# Patient Record
Sex: MALE | Race: AMERICAN INDIAN OR ALASKA NATIVE | NOT HISPANIC OR LATINO | URBAN - METROPOLITAN AREA
[De-identification: names, ages, dates, MRNs, and addresses within clinical notes are randomized per-mention and may not be internally consistent; named-entity substitution may affect disease eponyms.]

---

## 2018-02-23 ENCOUNTER — EMERGENCY (EMERGENCY)
Facility: HOSPITAL | Age: 33
LOS: 1 days | Discharge: HOME | End: 2018-02-23
Attending: EMERGENCY MEDICINE | Admitting: EMERGENCY MEDICINE
Payer: MEDICAID

## 2018-02-23 VITALS
HEART RATE: 88 BPM | TEMPERATURE: 99 F | SYSTOLIC BLOOD PRESSURE: 129 MMHG | HEIGHT: 66 IN | OXYGEN SATURATION: 99 % | DIASTOLIC BLOOD PRESSURE: 81 MMHG | RESPIRATION RATE: 20 BRPM

## 2018-02-23 DIAGNOSIS — Z98.890 OTHER SPECIFIED POSTPROCEDURAL STATES: Chronic | ICD-10-CM

## 2018-02-23 LAB
ALBUMIN SERPL ELPH-MCNC: 4.7 G/DL — SIGNIFICANT CHANGE UP (ref 3.3–5)
ALP SERPL-CCNC: 95 U/L — SIGNIFICANT CHANGE UP (ref 40–120)
ALT FLD-CCNC: 52 U/L RC — HIGH (ref 10–45)
ANION GAP SERPL CALC-SCNC: 14 MMOL/L — SIGNIFICANT CHANGE UP (ref 5–17)
AST SERPL-CCNC: 32 U/L — SIGNIFICANT CHANGE UP (ref 10–40)
BASOPHILS # BLD AUTO: 0 K/UL — SIGNIFICANT CHANGE UP (ref 0–0.2)
BASOPHILS NFR BLD AUTO: 0.4 % — SIGNIFICANT CHANGE UP (ref 0–2)
BILIRUB SERPL-MCNC: 0.4 MG/DL — SIGNIFICANT CHANGE UP (ref 0.2–1.2)
BUN SERPL-MCNC: 10 MG/DL — SIGNIFICANT CHANGE UP (ref 7–23)
CALCIUM SERPL-MCNC: 9.5 MG/DL — SIGNIFICANT CHANGE UP (ref 8.4–10.5)
CHLORIDE SERPL-SCNC: 100 MMOL/L — SIGNIFICANT CHANGE UP (ref 96–108)
CO2 SERPL-SCNC: 26 MMOL/L — SIGNIFICANT CHANGE UP (ref 22–31)
CREAT SERPL-MCNC: 0.92 MG/DL — SIGNIFICANT CHANGE UP (ref 0.5–1.3)
EOSINOPHIL # BLD AUTO: 0.2 K/UL — SIGNIFICANT CHANGE UP (ref 0–0.5)
EOSINOPHIL NFR BLD AUTO: 2.8 % — SIGNIFICANT CHANGE UP (ref 0–6)
GLUCOSE SERPL-MCNC: 103 MG/DL — HIGH (ref 70–99)
HCT VFR BLD CALC: 47.1 % — SIGNIFICANT CHANGE UP (ref 39–50)
HGB BLD-MCNC: 15.9 G/DL — SIGNIFICANT CHANGE UP (ref 13–17)
LYMPHOCYTES # BLD AUTO: 2.6 K/UL — SIGNIFICANT CHANGE UP (ref 1–3.3)
LYMPHOCYTES # BLD AUTO: 36.4 % — SIGNIFICANT CHANGE UP (ref 13–44)
MCHC RBC-ENTMCNC: 28.3 PG — SIGNIFICANT CHANGE UP (ref 27–34)
MCHC RBC-ENTMCNC: 33.8 GM/DL — SIGNIFICANT CHANGE UP (ref 32–36)
MCV RBC AUTO: 83.7 FL — SIGNIFICANT CHANGE UP (ref 80–100)
MONOCYTES # BLD AUTO: 0.6 K/UL — SIGNIFICANT CHANGE UP (ref 0–0.9)
MONOCYTES NFR BLD AUTO: 8 % — SIGNIFICANT CHANGE UP (ref 2–14)
NEUTROPHILS # BLD AUTO: 3.8 K/UL — SIGNIFICANT CHANGE UP (ref 1.8–7.4)
NEUTROPHILS NFR BLD AUTO: 52.4 % — SIGNIFICANT CHANGE UP (ref 43–77)
PLATELET # BLD AUTO: 173 K/UL — SIGNIFICANT CHANGE UP (ref 150–400)
POTASSIUM SERPL-MCNC: 3.9 MMOL/L — SIGNIFICANT CHANGE UP (ref 3.5–5.3)
POTASSIUM SERPL-SCNC: 3.9 MMOL/L — SIGNIFICANT CHANGE UP (ref 3.5–5.3)
PROT SERPL-MCNC: 8.4 G/DL — HIGH (ref 6–8.3)
RBC # BLD: 5.63 M/UL — SIGNIFICANT CHANGE UP (ref 4.2–5.8)
RBC # FLD: 11.6 % — SIGNIFICANT CHANGE UP (ref 10.3–14.5)
SODIUM SERPL-SCNC: 140 MMOL/L — SIGNIFICANT CHANGE UP (ref 135–145)
WBC # BLD: 7.3 K/UL — SIGNIFICANT CHANGE UP (ref 3.8–10.5)
WBC # FLD AUTO: 7.3 K/UL — SIGNIFICANT CHANGE UP (ref 3.8–10.5)

## 2018-02-23 PROCEDURE — 99284 EMERGENCY DEPT VISIT MOD MDM: CPT

## 2018-02-23 PROCEDURE — 80053 COMPREHEN METABOLIC PANEL: CPT

## 2018-02-23 PROCEDURE — 85027 COMPLETE CBC AUTOMATED: CPT

## 2018-02-23 PROCEDURE — 99283 EMERGENCY DEPT VISIT LOW MDM: CPT

## 2018-02-23 PROCEDURE — 82550 ASSAY OF CK (CPK): CPT

## 2018-02-23 RX ORDER — ACETAMINOPHEN 500 MG
650 TABLET ORAL ONCE
Qty: 0 | Refills: 0 | Status: COMPLETED | OUTPATIENT
Start: 2018-02-23 | End: 2018-02-23

## 2018-02-23 RX ORDER — IBUPROFEN 200 MG
600 TABLET ORAL ONCE
Qty: 0 | Refills: 0 | Status: COMPLETED | OUTPATIENT
Start: 2018-02-23 | End: 2018-02-23

## 2018-02-23 RX ORDER — LIDOCAINE 4 G/100G
1 CREAM TOPICAL ONCE
Qty: 0 | Refills: 0 | Status: COMPLETED | OUTPATIENT
Start: 2018-02-23 | End: 2018-02-23

## 2018-02-23 RX ADMIN — Medication 650 MILLIGRAM(S): at 20:13

## 2018-02-23 RX ADMIN — LIDOCAINE 1 PATCH: 4 CREAM TOPICAL at 20:15

## 2018-02-23 RX ADMIN — Medication 600 MILLIGRAM(S): at 20:13

## 2018-02-23 NOTE — ED PROVIDER NOTE - OBJECTIVE STATEMENT
[place]  ID [id number] and [id number]    33 yo M c PMH of HLD p/w a 3 year hx of atraumatic R neck and R shoulder pain. Pt reports he has had intermittent non-radiating R neck and shoulder pain occurring once every week or every other week lasting four hours each time that is 6 to 7/10 in severity. Pt went to [facility] 2 months ago and was given "voltarin" cream which helps alleviate his sx for a couple hours. Pt went to [facility] last year for same sx, pt states his CT head was "normal" at that time. Pt states that he came to the ED today because his father in law was coming to the hospital for his own visit and he decided to come get his sx checked on, but states that he is not here because he has any worsening sx recently or today, pt denies worsening sx today. Pt reports associated blurry vision in his R eye intermittently. Pt states he injured his head in a sports game 8 years ago but denies any neck or head surgery.    ROS positive: R neck, R shoulder pain, blurry vision  ROS negative: f/c, cp, sob, abdominal pain, n/v/d, focal numbness/weakness/tingling, slurred speech, facial droop, neck trauma, head trauma

## 2018-02-23 NOTE — ED PROVIDER NOTE - CARE PLAN
Assessment and plan of treatment:	1. You were seen for neck and shoulder pain. A copy of your resulted labs, imaging, and findings have been provided to you.  2. Take over the counter LIDODERM PATCH on your right shoulder, and motrin 600 mg with food every six hours (do not exceed 3,200 mg in a 24 hour period) and supplement with tylenol 650 mg every six hours (do not exceed 4000 mg of tylenol in a 24 hour period and do not drink alcohol while taking tylenol) between the motrin dosages to have a layered effect. Consult a pharmacist or your primary care doctor with any questions.   3. Follow up with your primary care doctor within 48 hours. Please call [phone] to make an appointment or with any questions you may have.  4. Return immediately to the emergency department for new, persistent, or worsening symptoms or signs. Return immediately to the emergency department if you have chest pain, shortness of breath, loss of consciousness, or loss of feeling or ability to move your arms or legs, or slurred speech.   1. For your health, you should not smoke or use drugs, and you should not drink alcohol in excess.  2. Also for your health, you should make healthy food choices and be physically active.    3. Resources for healthy recipes and food choices are:  a. https://www.choosemyplate.gov/ten-tips-build-healthy-meal      b. https://www.drweil.com/diet-nutrition/recipes/  c. https://www.[facility].[place].[facility]/nutritionsource/recipes-2/home-cooking/  4. Resources for physical activity are:       a. https://www.[employer].org/programs/recreation/shape-up-[facility]      b. https://www.cdc.gov/physicalactivity/basics/index.htm  c. https://www.mayoclinic.org/healthy-lifestyle/fitness/basics/fitness-basics/hlv-60890296?weNfjb=72970790  d. https://wh5nxic.gabino.nih.gov/get-started  5. Resources for wellness are:  a. https://[facility]./en/      b. https://www.[facility].org/patient-care/[facility]/  c. http://pan.[facility]/mindful-meditations Assessment and plan of treatment:	1. You were seen for neck and shoulder pain. A copy of your resulted labs, imaging, and findings have been provided to you.  2. Take over the counter LIDODERM PATCH on your right shoulder, and motrin 600 mg with food every six hours (do not exceed 3,200 mg in a 24 hour period) and supplement with tylenol 650 mg every six hours (do not exceed 4000 mg of tylenol in a 24 hour period and do not drink alcohol while taking tylenol) between the motrin dosages to have a layered effect. Consult a pharmacist or your primary care doctor with any questions.   3. Follow up with Dr. Raj Loomis (name and number provided) and your primary care doctor within 48 hours. Please call [phone] to make an appointment or with any questions you may have.  4. Return immediately to the emergency department for new, persistent, or worsening symptoms or signs. Return immediately to the emergency department if you have chest pain, shortness of breath, loss of consciousness, or loss of feeling or ability to move your arms or legs, or slurred speech.   1. For your health, you should not smoke or use drugs, and you should not drink alcohol in excess.  2. Also for your health, you should make healthy food choices and be physically active.    3. Resources for healthy recipes and food choices are:  a. https://www.choosemyplate.gov/ten-tips-build-healthy-meal      b. https://www.drweil.com/diet-nutrition/recipes/  c. https://www.[facility].[place].[facility]/nutritionsource/recipes-2/home-cooking/  4. Resources for physical activity are:       a. https://www.[employer].org/programs/recreation/shape-up-[facility]      b. https://www.cdc.gov/physicalactivity/basics/index.htm  c. https://www.mayoclinic.org/healthy-lifestyle/fitness/basics/fitness-basics/hlv-28288102?lpAoqv=29109202  d. https://yr5yjws.gabino.nih.gov/get-started  5. Resources for wellness are:  a. https://[facility]./en/      b. https://www.[facility].org/patient-care/wheel-of-health/  c. http://pan.[facility]/mindful-meditations Principal Discharge DX:	Cervical radicular pain  Assessment and plan of treatment:	1. You were seen for neck and shoulder pain. A copy of your resulted labs, imaging, and findings have been provided to you.  2. Take over the counter LIDODERM PATCH on your right shoulder, and motrin 600 mg with food every six hours (do not exceed 3,200 mg in a 24 hour period) and supplement with tylenol 650 mg every six hours (do not exceed 4000 mg of tylenol in a 24 hour period and do not drink alcohol while taking tylenol) between the motrin dosages to have a layered effect. Consult a pharmacist or your primary care doctor with any questions.   3. Follow up with Dr. Raj Loomis (name and number provided) and your primary care doctor within 48 hours. Please call [phone] to make an appointment or with any questions you may have.  4. Return immediately to the emergency department for new, persistent, or worsening symptoms or signs. Return immediately to the emergency department if you have chest pain, shortness of breath, loss of consciousness, or loss of feeling or ability to move your arms or legs, or slurred speech.   1. For your health, you should not smoke or use drugs, and you should not drink alcohol in excess.  2. Also for your health, you should make healthy food choices and be physically active.    3. Resources for healthy recipes and food choices are:  a. https://www.choosemyplate.gov/ten-tips-build-healthy-meal      b. https://www.drweil.com/diet-nutrition/recipes/  c. https://www.[facility].[place].[facility]/nutritionsource/recipes-2/home-cooking/  4. Resources for physical activity are:       a. https://www.[employer].org/programs/recreation/shape-up-[facility]      b. https://www.cdc.gov/physicalactivity/basics/index.htm  c. https://www.mayoclinic.org/healthy-lifestyle/fitness/basics/fitness-basics/hlv-18354060?omUibk=17357756  d. https://ts5anym.gabino.nih.gov/get-started  5. Resources for wellness are:  a. https://[employer].[facility]./en/      b. https://www.[facility].org/patient-care/[facility]-of-health/  c. http://pan.[facility]/mindful-meditations

## 2018-02-23 NOTE — ED PROVIDER NOTE - PROGRESS NOTE DETAILS
labs grossly wnl, ck wnl, pt's pain likely represents cervicalgia or cervical radiculopathy with d/c with pcp and pain specialist f/u

## 2018-02-23 NOTE — ED ADULT TRIAGE NOTE - CHIEF COMPLAINT QUOTE
R posterior head pain radiating to neck  x 3 yrs, +pain relieved with pain meds but persists when meds wear off

## 2018-02-23 NOTE — ED PROVIDER NOTE - PLAN OF CARE
1. You were seen for neck and shoulder pain. A copy of your resulted labs, imaging, and findings have been provided to you.  2. Take over the counter LIDODERM PATCH on your right shoulder, and motrin 600 mg with food every six hours (do not exceed 3,200 mg in a 24 hour period) and supplement with tylenol 650 mg every six hours (do not exceed 4000 mg of tylenol in a 24 hour period and do not drink alcohol while taking tylenol) between the motrin dosages to have a layered effect. Consult a pharmacist or your primary care doctor with any questions.   3. Follow up with your primary care doctor within 48 hours. Please call [phone] to make an appointment or with any questions you may have.  4. Return immediately to the emergency department for new, persistent, or worsening symptoms or signs. Return immediately to the emergency department if you have chest pain, shortness of breath, loss of consciousness, or loss of feeling or ability to move your arms or legs, or slurred speech.   1. For your health, you should not smoke or use drugs, and you should not drink alcohol in excess.  2. Also for your health, you should make healthy food choices and be physically active.    3. Resources for healthy recipes and food choices are:  a. https://www.choosemyplate.gov/ten-tips-build-healthy-meal      b. https://www.drweil.com/diet-nutrition/recipes/  c. https://www.\[facility]".[place].edu/nutritionsource/recipes-2/home-cooking/  4. Resources for physical activity are:       a. https://www.[employer].org/programs/recreation/shape-up-[facility]      b. https://www.cdc.gov/physicalactivity/basics/index.htm  c. https://www.mayoclinic.org/healthy-lifestyle/fitness/basics/fitness-basics/hlv-62595625?jpGogm=59081195  d. https://re0emcf.gabino.nih.gov/get-started  5. Resources for wellness are:  a. https://[facility].us/en/      b. https://www.[facility].org/patient-care/[facility]-of-health/  c. http://pan.[facility]/mindful-meditations 1. You were seen for neck and shoulder pain. A copy of your resulted labs, imaging, and findings have been provided to you.  2. Take over the counter LIDODERM PATCH on your right shoulder, and motrin 600 mg with food every six hours (do not exceed 3,200 mg in a 24 hour period) and supplement with tylenol 650 mg every six hours (do not exceed 4000 mg of tylenol in a 24 hour period and do not drink alcohol while taking tylenol) between the motrin dosages to have a layered effect. Consult a pharmacist or your primary care doctor with any questions.   3. Follow up with Dr. Raj Loomis (name and number provided) and your primary care doctor within 48 hours. Please call [phone] to make an appointment or with any questions you may have.  4. Return immediately to the emergency department for new, persistent, or worsening symptoms or signs. Return immediately to the emergency department if you have chest pain, shortness of breath, loss of consciousness, or loss of feeling or ability to move your arms or legs, or slurred speech.   1. For your health, you should not smoke or use drugs, and you should not drink alcohol in excess.  2. Also for your health, you should make healthy food choices and be physically active.    3. Resources for healthy recipes and food choices are:  a. https://www.choosemyplate.gov/ten-tips-build-healthy-meal      b. https://www.drweil.com/diet-nutrition/recipes/  c. https://www.[facility].[employer].edu/nutritionsource/recipes-2/home-cooking/  4. Resources for physical activity are:       a. https://www.[employer].org/programs/recreation/shape-up-[facility]      b. https://www.cdc.gov/physicalactivity/basics/index.htm  c. https://www.mayoclinic.org/healthy-lifestyle/fitness/basics/fitness-basics/hlv-36804013?cjDlpv=21241460  d. https://rz4kppe.gabino.nih.gov/get-started  5. Resources for wellness are:  a. https://nywell.[facility].us/en/      b. https://www.[facility].org/patient-care/[facility]-of-health/  c. http://pan.[facility]/mindful-meditations

## 2018-02-23 NOTE — ED PROVIDER NOTE - CHIEF COMPLAINT
The patient is a 32y Male complaining of The patient is a 32y Male complaining of neck and shoulder pain

## 2018-02-23 NOTE — ED PROVIDER NOTE - PHYSICAL EXAMINATION
GENERAL: Well appearing, well nourished, awake, alert and in NAD  ENMT: Airway patent, Nasal mucosa clear. Mouth with MMM. Throat has no vesicles, no oropharyngeal exudates and uvula is midline.  EYES: conjunctiva clear, sclera anicteric, PERRL, 20/20 vision OD, OS, OU  CARDIAC: Regular rate, regular rhythm.  Heart sounds S1, S2, no S3, S4. No murmurs, rubs or gallops.  RESPIRATORY: Breath sounds clear and equal in bilateral anterior lung fields, no wheezes/ronchi/stridor; pt breathing and speaking comfortably with no increased WOB, no accessory mm. use, no intercostal retractions, no nasal flaring  NEURO: pupils 4 mm, PERRL, EOMI (CN III, IV, VI), facial sensation intact to light touch in all 3 divisions bilat (CN V), face is symmetric with normal eye closure, eye opening, and smile (CN VII), hearing is normal to rubbing fingers (CN VII), palate elevates symmetrically, phonation is normal (CN IX, X),  shoulder shrug intact bilat (CN XI), tongue is midline with nl movements and no atrophy (CN XII), finger to nose test nl bilat, negative pronator drift bilat, negative Romberg, speech is clear; 5/5 motor strength BUE and BLE: deltoids, biceps, triceps, wrist flexors/extensors, hand , hip flexors, knee flexors/extensors, plantar/dorsiflexors, hallux flexors/extensors; sensation intact to light touch BUE and BLE: C5-T1 and L3-S1; gait wnl

## 2018-02-23 NOTE — ED PROVIDER NOTE - MEDICAL DECISION MAKING DETAILS
31 yo M c PMH of HLD p/w a 3 year hx of atraumatic R neck and R shoulder pain. Pt reports he has had intermittent non-radiating R neck and shoulder pain occurring once every week or every other week lasting four hours each time that is 6 to 7/10 in severity. Pt states his sx have not worsened he is only here today because his father in law happened to have an appointment here so he decided to pursue his sx in the ED, although he states he has a HA now. Pt states he had normal head CT 1 year ago at [facility]. On exam, vs are wnl, no focal neuro deficits. will check ck and basic labs to check for abnormalities and give tylenol, motrin, lidoderm patch, and hot pack for pain control. explained to pt he will likely need a neuro/ortho/pain specialist who will follow his sx and tx them and f/u on the tx, pt states he understands. will reassess.

## 2018-02-23 NOTE — ED PROVIDER NOTE - ATTENDING CONTRIBUTION TO CARE
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 No hx trauma, Pain waxes and wanes. Can last for several hours. Currently in pain. Pain worse today. Took voltaren cream with mild improvement. No fever chills shortness of breath chest pain. No meds, dm,htn, Pt employed as . Did not miss work today. PE WDWN male nad normocephalic atraumatic chest clear anterior & posterior abd soft +bs neuro no focal defect. Rt neck no swelling/erythema/warmth/rash/lesion/post tenderness. Neruo no focal defects. CV no rmg  Segundo Luz MD, Facep

## 2021-12-28 ENCOUNTER — EMERGENCY (EMERGENCY)
Facility: HOSPITAL | Age: 36
LOS: 1 days | End: 2021-12-28
Attending: STUDENT IN AN ORGANIZED HEALTH CARE EDUCATION/TRAINING PROGRAM
Payer: MEDICAID

## 2021-12-28 VITALS
DIASTOLIC BLOOD PRESSURE: 81 MMHG | SYSTOLIC BLOOD PRESSURE: 127 MMHG | WEIGHT: 164.91 LBS | HEART RATE: 90 BPM | RESPIRATION RATE: 18 BRPM | OXYGEN SATURATION: 98 % | TEMPERATURE: 98 F | HEIGHT: 66 IN

## 2021-12-28 DIAGNOSIS — Z98.890 OTHER SPECIFIED POSTPROCEDURAL STATES: Chronic | ICD-10-CM

## 2021-12-28 PROBLEM — E78.5 HYPERLIPIDEMIA, UNSPECIFIED: Chronic | Status: ACTIVE | Noted: 2018-02-23

## 2021-12-28 PROCEDURE — L9992: CPT

## 2022-08-24 NOTE — ED ADULT TRIAGE NOTE - HEIGHT IN FEET
I have reviewed the patient's medications and allergies, past medical, surgical, social and family history, updating these as appropriate.  See Histories section of the EMR for a display of this information. No reported changes since PCP visit.   5
